# Patient Record
Sex: MALE | Race: WHITE | NOT HISPANIC OR LATINO | Employment: OTHER | URBAN - METROPOLITAN AREA
[De-identification: names, ages, dates, MRNs, and addresses within clinical notes are randomized per-mention and may not be internally consistent; named-entity substitution may affect disease eponyms.]

---

## 2017-01-05 ENCOUNTER — TELEPHONE (OUTPATIENT)
Dept: ADMINISTRATIVE | Facility: OTHER | Age: 82
End: 2017-01-05

## 2017-01-05 NOTE — TELEPHONE ENCOUNTER
spoke with patient  Appointment on 1/25 has been rescheduled to 1/27  Patient confirmed appointment.

## 2017-01-27 ENCOUNTER — INITIAL CONSULT (OUTPATIENT)
Dept: HEMATOLOGY/ONCOLOGY | Facility: CLINIC | Age: 82
End: 2017-01-27
Payer: MEDICARE

## 2017-01-27 VITALS
RESPIRATION RATE: 18 BRPM | SYSTOLIC BLOOD PRESSURE: 102 MMHG | TEMPERATURE: 98 F | BODY MASS INDEX: 23.03 KG/M2 | WEIGHT: 143.31 LBS | HEART RATE: 78 BPM | DIASTOLIC BLOOD PRESSURE: 69 MMHG | OXYGEN SATURATION: 97 % | HEIGHT: 66 IN

## 2017-01-27 DIAGNOSIS — Z51.11 ENCOUNTER FOR CHEMOTHERAPY MANAGEMENT: ICD-10-CM

## 2017-01-27 DIAGNOSIS — G30.9 ALZHEIMER'S DEMENTIA WITH BEHAVIORAL DISTURBANCE, UNSPECIFIED TIMING OF DEMENTIA ONSET: ICD-10-CM

## 2017-01-27 DIAGNOSIS — F02.81 ALZHEIMER'S DEMENTIA WITH BEHAVIORAL DISTURBANCE, UNSPECIFIED TIMING OF DEMENTIA ONSET: ICD-10-CM

## 2017-01-27 DIAGNOSIS — R91.8 MASS OF LOWER LOBE OF LEFT LUNG: Primary | ICD-10-CM

## 2017-01-27 PROCEDURE — 1126F AMNT PAIN NOTED NONE PRSNT: CPT | Mod: S$GLB,,, | Performed by: INTERNAL MEDICINE

## 2017-01-27 PROCEDURE — 1159F MED LIST DOCD IN RCRD: CPT | Mod: S$GLB,,, | Performed by: INTERNAL MEDICINE

## 2017-01-27 PROCEDURE — 99215 OFFICE O/P EST HI 40 MIN: CPT | Mod: S$GLB,,, | Performed by: INTERNAL MEDICINE

## 2017-01-27 PROCEDURE — 1157F ADVNC CARE PLAN IN RCRD: CPT | Mod: S$GLB,,, | Performed by: INTERNAL MEDICINE

## 2017-01-27 PROCEDURE — 1160F RVW MEDS BY RX/DR IN RCRD: CPT | Mod: S$GLB,,, | Performed by: INTERNAL MEDICINE

## 2017-01-27 PROCEDURE — 99999 PR PBB SHADOW E&M-EST. PATIENT-LVL III: CPT | Mod: PBBFAC,,, | Performed by: INTERNAL MEDICINE

## 2017-01-27 PROCEDURE — 99499 UNLISTED E&M SERVICE: CPT | Mod: S$GLB,,, | Performed by: INTERNAL MEDICINE

## 2017-01-27 NOTE — LETTER
January 27, 2017      Ebenezer Harrell MD  [address]  [facility] [address]           [facility]ematology On[facility]  [address]  [facility] [address]  Phone: [phone]          Patient: Osiel Taylor   MR Number: [id number]   YOB: 1927   Date of Visit: 1/27/2017       Dear Dr. Ebenezer Harrell:    Thank you for referring Osiel Taylor to me for evaluation. Attached you will find relevant portions of my assessment and plan of care.    If you have questions, please do not hesitate to call me. I look forward to following Osiel Taylor along with you.    Sincerely,    Horacio Vega MD    Enclosure  CC:  No Recipients    If you would like to receive this communication electronically, please contact [email or web address] or [phone] to request more information on [employer] access.    For providers and/or their staff who would like to refer a patient to [facility], please contact us through our one-stop-shop provider referral line, Jose Boss, at [phone].    If you feel you have received this communication in error or would no longer like to receive these types of communications, please e-mail [email or web address]

## 2017-01-27 NOTE — PROGRESS NOTES
"Subjective:       Patient ID: Osiel Taylor is a 89 y.o. male.    Chief Complaint: Lung Cancer and Dementia    HPI   Osiel Taylor is an 89-year-old male to clinic for consultation regarding enlarging left lower lobe mass. Pt has a past medical history including dementia, HTN and post-herpetic neuralgia who initially presented to the ED on 12/10 for change in mental status w/ an episode of syncope. In the ED, a CXR showed an interval growth in size of a left lower lobe pulmonary nodule. A CT of the chest showed a large, soft tissue attenuating, 3.0 x 3.4 x 2.3 cm pulmonary mass. Patient has a history of smoking a PPD for approximately 50 years. Quit 20 year ago 2/2 to cough and SOB. According to wife, patient has not had any coughing, hemoptysis, loss of appetite, night sweats or fevers. She reports a good appetite.     He denies any mouth sores, nausea, vomiting, diarrhea, constipation, abdominal pain, weight loss or loss of appetite, chest pain, shortness of breath, leg swelling, fatigue, pain, headache, or dizziness.     He is accompanied by his wife.     Oncologic History (From Chart):  12/4/15- SQUAMOUS CELL CARCINOMA IN SITU to cutaneous horn lesion located on scalp    Diagnostic Results:  CT Chest w/o contrast 12/12/2016-"There is a large, soft tissue attenuating, 3.0 x 3.4 x 2.3 cm pulmonary mass within the superior segment of the left lower lobe. When compared to most recent chest CT of 11/17/2009, this has significantly intervally increased in size. There are multiple scattered regions of groundglass opacity throughout the right lung including an approximately 2.1 cm region in the right upper lobe with more extensive/confluent regions in the right middle and lower lobes. There is a more focal region of groundglass/sub-solid density in the right lower lobe measuring approximately 2.3 cm. Findings are nonspecific and could represent infection, and formation or neoplasm. No pleural fluid is present. No " "pneumothorax. There is diffuse central lobular emphysematous change of the pulmonary parenchyma."  CT Head w/o contrast 12/11/2016-"No acute abnormality. No definite hyperdense MCA is identified on either side. of note, noncontrast CT provides limited evaluation of vascular structures.  If detailed evaluation of vascular structures is needed, consider CTA or MRA.Generalized cervical volume loss with findings of chronic microvascular ischemic disease."  MRI Brain 12/13/16- Significantly limited and incomplete examination secondary to patient motion as well as premature termination of the examination secondary to patient inability to cooperate. No definite evidence to suggest acute infarct. Repeat examination is recommended when patient better able to tolerate the exam.Small focus of gradient susceptibility within the anterior right temporal horn, similar to prior MRI examination, likely reflecting sequela of remote trauma.  CT A/P 12/14/16- In this patient with known left lung mass, there is no definitive evidence of distant metastatic disease in the abdomen and pelvis. There is a 1.8 cm enhancing splenic lesion which becomes relatively isodense to the splenic parenchyma on delayed phase imaging. 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 Right lung patchy groundglass attenuation and soft tissue opacities, similar to prior exam, which are nonspecific and may represent pneumonia, pulmonary edema, with neoplastic process not excluded.    Review of Systems   Constitutional: Negative for activity change, appetite change, fatigue, fever and unexpected weight change.   HENT: Negative for mouth sores, nosebleeds and trouble swallowing.    Eyes: Negative for discharge and visual disturbance.   Respiratory: Negative for cough, shortness of breath and wheezing.    Cardiovascular: Negative for chest pain and leg swelling.   Gastrointestinal: " Negative for abdominal distention, abdominal pain, blood in stool, constipation, diarrhea, nausea and vomiting.   Genitourinary: Negative for decreased urine volume, difficulty urinating, frequency and hematuria.   Musculoskeletal: Negative for back pain.   Skin: Negative for color change and rash.   Neurological: Negative for weakness and headaches.   Hematological: Negative for adenopathy.   Psychiatric/Behavioral: Positive for behavioral problems (dementia). Negative for sleep disturbance. The patient is not nervous/anxious.    All other systems reviewed and are negative.      Allergies:  Review of patient's allergies indicates:   Allergen Reactions    Codeine Nausea Only       Medications:  Current Outpatient Prescriptions   Medication Sig Dispense Refill    hydrocodone-acetaminophen 10-325mg (NORCO)  mg Tab Take 1 tablet by mouth every 24 hours as needed for Pain.  0    pregabalin (LYRICA) 300 MG Cap Take 1 capsule (300 mg total) by mouth 2 (two) times daily. 60 capsule 6     No current facility-administered medications for this visit.        PMH:  Past Medical History   Diagnosis Date    Basal cell carcinoma     BPPV (benign paroxysmal positional vertigo)     Dementia     Heart disease, unspecified     Hypertension     PHN (postherpetic neuralgia)        PSH:  Past Surgical History   Procedure Laterality Date    Skin cancer destruction  1990s    Cataract extraction, bilateral  1980s       FamHx:  Family History   Problem Relation Age of Onset    Lung cancer Father     Diabetes Daughter     Heart disease Son        SocHx:  Social History     Social History    Marital status:      Spouse name: N/A    Number of children: N/A    Years of education: N/A     Occupational History    Not on file.     Social History Main Topics    Smoking status: Former Smoker     Packs/day: 1.00     Years: 50.00     Types: Cigarettes     Quit date: 1/27/1991    Smokeless tobacco: Never Used    Alcohol  use Yes      Comment: Rare    Drug use: No    Sexual activity: Not on file     Other Topics Concern    Not on file     Social History Narrative       Distress Score    Distress Score: 0        Practical Problems Physical Problems   : No Appearance: No   Housing: No Bathing / Dressing: No   Insurance / Financial: No Breathing: No    Transportation: No  Changes in Urination: No    Work / School: No  Constipation: No   Treatment Decisions: No  Diarrhea: No     Eating: No    Family Problems Fatigue: No    Dealing with Children: No Feeling Swollen: No    Dealing with Partner: No Fevers: No    Ability to Have Children: No  Getting Around: No    Family Health Issues: No  Indigestion: No     Memory / Concentration: No   Emotional Problems Mouth Sores: No    Depression: No  Nausea: No    Fears: No  Nose Dry / Congested: No    Nervousness: No  Pain: No    Sadness: No Sexual: No    Worry: No Skin Dry / Itchy: No    Lost of Interest in Usual Activities: No Sleep: No          Spiritual/Religions Concerns Tingling in Hands / Feet: No   Spritual / Episcopalian Concerns: No         Other Problems              Objective:      Physical Exam   Constitutional: He appears well-developed and well-nourished.   HENT:   Head: Normocephalic and atraumatic.   Mouth/Throat: Uvula is midline. No oropharyngeal exudate.   Eyes: Conjunctivae and EOM are normal. Pupils are equal, round, and reactive to light.   Neck: Trachea normal and normal range of motion. Neck supple. No tracheal deviation present.   Cardiovascular: Normal rate, regular rhythm, normal heart sounds and normal pulses.    No swelling noted to bilateral lower extremities.   Pulmonary/Chest: Effort normal and breath sounds normal. No respiratory distress. He has no wheezes. He has no rales.   Abdominal: Soft. Normal appearance and bowel sounds are normal. He exhibits no distension. There is no tenderness.   Musculoskeletal:   No spinal or paraspinal tenderness to  palpation.    Skin: Skin is warm, dry and intact. He is not diaphoretic.   Psychiatric: His speech is normal.   Nursing note and vitals reviewed.      Assessment:       1. Mass of lower lobe of left lung    2. Encounter for chemotherapy management    3. Alzheimer's dementia with behavioral disturbance, unspecified timing of dementia onset        Plan:       Osiel Taylor is an 89-year-old male to clinic for consultation regarding enlarging left lower lobe mass. The patient was previously seen by Dr. Vega in the hospital as a consult where the POA initially wanted to pursue a biopsy of the lung mass but subsequently refused. The patient's wife states that the patient does not wish to pursue a biopsy or treatment. Given advanced age, poor PS, and advanced, end-stage dementia, and the fact the family/ patient doesn't want to pursue further treatment, conservative measures with symptom control is both reasonable and appropriate. Discussed use of hospice and wife is in agreement. Will refer patient to hospice.    Distress Screening Results: Psychosocial Distress screening score of Distress Score: 0 noted and reviewed. No intervention indicated.       I have reviewed the notes, assessments, and/or procedures performed by the housestaff, as above.  I have personally interviewed and examined the patient at the beside, and rounded with the housestaff. I concur with her/his assessment and plan and the documentation of Osiel Taylor.  More than 60 mins were spent during this encounter, greater than 50% was spent in direct counseling and/or coordination of care.     Horacio Vega M.D., M.S.  Hematology/Oncology Attending  [facility]

## 2017-01-28 NOTE — PROGRESS NOTES
Patient, Osiel Taylor (MRN #[id number]), presented with a recent Platelet count less than 150 K/uL consistent with the definition of thrombocytopenia (ICD10 - D69.6).    Platelets   Date Value Ref Range Status   12/13/2016 134 (L) 150 - 350 K/uL Final     The patient's thrombocytopenia was monitored, evaluated, addressed and/or treated. This addendum to the medical record is made on 01/27/2017.

## 2017-02-01 ENCOUNTER — TELEPHONE (OUTPATIENT)
Dept: HEMATOLOGY/ONCOLOGY | Facility: CLINIC | Age: 82
End: 2017-02-01

## 2017-02-01 NOTE — TELEPHONE ENCOUNTER
----- Message from Jailene Allen sent at 2/1/2017 12:04 PM CST -----  Contact: pt's wife Pat  Pt's wife is calling to see what is going on with her hospice referral.  Please call to advise.    Contact number [phone]

## 2017-02-06 NOTE — TELEPHONE ENCOUNTER
"Returned call to pt's wife Kaleigh"Dimas Taylor to discuss hospice services. Referral was made to [facility].  "

## 2017-05-20 PROBLEM — R79.89 ELEVATED TROPONIN: Status: ACTIVE | Noted: 2017-05-20

## 2017-05-20 PROBLEM — G93.40 ACUTE ENCEPHALOPATHY: Status: ACTIVE | Noted: 2017-05-20

## 2017-05-20 PROBLEM — R10.9 ACUTE ABDOMINAL PAIN: Status: ACTIVE | Noted: 2017-05-20

## 2017-05-21 ENCOUNTER — HOSPITAL ENCOUNTER (EMERGENCY)
Facility: HOSPITAL | Age: 82
Discharge: HOSPICE | End: 2017-05-21
Attending: EMERGENCY MEDICINE
Payer: MEDICARE

## 2017-05-21 VITALS
OXYGEN SATURATION: 95 % | RESPIRATION RATE: 18 BRPM | HEART RATE: 82 BPM | TEMPERATURE: 98 F | SYSTOLIC BLOOD PRESSURE: 118 MMHG | DIASTOLIC BLOOD PRESSURE: 57 MMHG

## 2017-05-21 DIAGNOSIS — C79.9 METASTATIC CANCER: Primary | ICD-10-CM

## 2017-05-21 LAB
ALBUMIN SERPL BCP-MCNC: 3.2 G/DL
ALP SERPL-CCNC: 63 U/L
ALT SERPL W/O P-5'-P-CCNC: 9 U/L
ANION GAP SERPL CALC-SCNC: 10 MMOL/L
AST SERPL-CCNC: 20 U/L
BACTERIA #/AREA URNS AUTO: NORMAL /HPF
BASOPHILS # BLD AUTO: 0.03 K/UL
BASOPHILS NFR BLD: 0.3 %
BILIRUB SERPL-MCNC: 1.4 MG/DL
BILIRUB UR QL STRIP: NEGATIVE
BUN SERPL-MCNC: 19 MG/DL
CALCIUM SERPL-MCNC: 9.3 MG/DL
CHLORIDE SERPL-SCNC: 105 MMOL/L
CLARITY UR REFRACT.AUTO: ABNORMAL
CO2 SERPL-SCNC: 25 MMOL/L
COLOR UR AUTO: YELLOW
CREAT SERPL-MCNC: 1.2 MG/DL
DIFFERENTIAL METHOD: ABNORMAL
EOSINOPHIL # BLD AUTO: 0 K/UL
EOSINOPHIL NFR BLD: 0 %
ERYTHROCYTE [DISTWIDTH] IN BLOOD BY AUTOMATED COUNT: 14.3 %
EST. GFR  (AFRICAN AMERICAN): >60 ML/MIN/1.73 M^2
EST. GFR  (NON AFRICAN AMERICAN): 53.3 ML/MIN/1.73 M^2
GLUCOSE SERPL-MCNC: 116 MG/DL
GLUCOSE UR QL STRIP: NEGATIVE
HCT VFR BLD AUTO: 46.1 %
HGB BLD-MCNC: 15.8 G/DL
HGB UR QL STRIP: ABNORMAL
HYALINE CASTS UR QL AUTO: 1 /LPF
KETONES UR QL STRIP: ABNORMAL
LACTATE SERPL-SCNC: 1.7 MMOL/L
LEUKOCYTE ESTERASE UR QL STRIP: NEGATIVE
LYMPHOCYTES # BLD AUTO: 1 K/UL
LYMPHOCYTES NFR BLD: 9.7 %
MCH RBC QN AUTO: 30.7 PG
MCHC RBC AUTO-ENTMCNC: 34.3 %
MCV RBC AUTO: 90 FL
MICROSCOPIC COMMENT: NORMAL
MONOCYTES # BLD AUTO: 1.6 K/UL
MONOCYTES NFR BLD: 16 %
NEUTROPHILS # BLD AUTO: 7.4 K/UL
NEUTROPHILS NFR BLD: 73.9 %
NITRITE UR QL STRIP: NEGATIVE
PH UR STRIP: 5 [PH] (ref 5–8)
PLATELET # BLD AUTO: 127 K/UL
PMV BLD AUTO: 10.1 FL
POTASSIUM SERPL-SCNC: 4.1 MMOL/L
PROT SERPL-MCNC: 7.7 G/DL
PROT UR QL STRIP: ABNORMAL
RBC # BLD AUTO: 5.15 M/UL
RBC #/AREA URNS AUTO: 2 /HPF (ref 0–4)
SODIUM SERPL-SCNC: 140 MMOL/L
SP GR UR STRIP: 1.02 (ref 1–1.03)
SQUAMOUS #/AREA URNS AUTO: 0 /HPF
URN SPEC COLLECT METH UR: ABNORMAL
UROBILINOGEN UR STRIP-ACNC: ABNORMAL EU/DL
WBC # BLD AUTO: 9.94 K/UL
WBC #/AREA URNS AUTO: 0 /HPF (ref 0–5)

## 2017-05-21 PROCEDURE — 87088 URINE BACTERIA CULTURE: CPT

## 2017-05-21 PROCEDURE — 63600175 PHARM REV CODE 636 W HCPCS: Performed by: INTERNAL MEDICINE

## 2017-05-21 PROCEDURE — 87077 CULTURE AEROBIC IDENTIFY: CPT

## 2017-05-21 PROCEDURE — 87086 URINE CULTURE/COLONY COUNT: CPT

## 2017-05-21 PROCEDURE — 87040 BLOOD CULTURE FOR BACTERIA: CPT

## 2017-05-21 PROCEDURE — 99285 EMERGENCY DEPT VISIT HI MDM: CPT | Mod: 25

## 2017-05-21 PROCEDURE — P9612 CATHETERIZE FOR URINE SPEC: HCPCS

## 2017-05-21 PROCEDURE — 83605 ASSAY OF LACTIC ACID: CPT

## 2017-05-21 PROCEDURE — 96361 HYDRATE IV INFUSION ADD-ON: CPT

## 2017-05-21 PROCEDURE — 87186 SC STD MICRODIL/AGAR DIL: CPT

## 2017-05-21 PROCEDURE — 85025 COMPLETE CBC W/AUTO DIFF WBC: CPT

## 2017-05-21 PROCEDURE — 25000003 PHARM REV CODE 250: Performed by: INTERNAL MEDICINE

## 2017-05-21 PROCEDURE — 96374 THER/PROPH/DIAG INJ IV PUSH: CPT

## 2017-05-21 PROCEDURE — 99285 EMERGENCY DEPT VISIT HI MDM: CPT | Mod: GV,,, | Performed by: EMERGENCY MEDICINE

## 2017-05-21 PROCEDURE — 80053 COMPREHEN METABOLIC PANEL: CPT

## 2017-05-21 PROCEDURE — 81001 URINALYSIS AUTO W/SCOPE: CPT

## 2017-05-21 RX ORDER — LORAZEPAM 2 MG/ML
0.5 INJECTION INTRAMUSCULAR
Status: DISCONTINUED | OUTPATIENT
Start: 2017-05-21 | End: 2017-05-22 | Stop reason: HOSPADM

## 2017-05-21 RX ORDER — FENTANYL 25 UG/1
1 PATCH TRANSDERMAL
Status: DISCONTINUED | OUTPATIENT
Start: 2017-05-21 | End: 2017-05-22 | Stop reason: HOSPADM

## 2017-05-21 RX ORDER — DEXAMETHASONE SODIUM PHOSPHATE 4 MG/ML
4 INJECTION, SOLUTION INTRA-ARTICULAR; INTRALESIONAL; INTRAMUSCULAR; INTRAVENOUS; SOFT TISSUE ONCE
Status: COMPLETED | OUTPATIENT
Start: 2017-05-21 | End: 2017-05-21

## 2017-05-21 RX ORDER — MORPHINE SULFATE 2 MG/ML
4 INJECTION, SOLUTION INTRAMUSCULAR; INTRAVENOUS
Status: DISCONTINUED | OUTPATIENT
Start: 2017-05-21 | End: 2017-05-22 | Stop reason: HOSPADM

## 2017-05-21 RX ORDER — DEXTROSE MONOHYDRATE AND SODIUM CHLORIDE 5; .45 G/100ML; G/100ML
INJECTION, SOLUTION INTRAVENOUS CONTINUOUS
Status: DISCONTINUED | OUTPATIENT
Start: 2017-05-21 | End: 2017-05-22 | Stop reason: HOSPADM

## 2017-05-21 RX ORDER — MORPHINE SULFATE 2 MG/ML
2 INJECTION, SOLUTION INTRAMUSCULAR; INTRAVENOUS
Status: DISCONTINUED | OUTPATIENT
Start: 2017-05-21 | End: 2017-05-22 | Stop reason: HOSPADM

## 2017-05-21 RX ADMIN — DEXTROSE AND SODIUM CHLORIDE: 5; .45 INJECTION, SOLUTION INTRAVENOUS at 08:05

## 2017-05-21 RX ADMIN — FENTANYL 1 PATCH: 25 PATCH, EXTENDED RELEASE TRANSDERMAL at 09:05

## 2017-05-21 RX ADMIN — DEXAMETHASONE SODIUM PHOSPHATE 4 MG: 4 INJECTION, SOLUTION INTRAMUSCULAR; INTRAVENOUS at 08:05

## 2017-05-21 NOTE — ED TRIAGE NOTES
Patient brought in by due to altered mental status. Patient was seen yesterday at [facility]. Patient's family states patient has been in unresponsive since last Friday.

## 2017-05-22 NOTE — ED PROVIDER NOTES
Encounter Date: 5/21/2017    SCRIBE #1 NOTE: I, Zuleima Luis, am scribing for, and in the presence of,  Dr. Ayers. I have scribed the following portions of the note - the Resident attestation.       History     Chief Complaint   Patient presents with    Altered Mental Status     Pt presented to ER with alerted mental status. Pt is a hospice pt. Pt lethargic.      Review of patient's allergies indicates:   Allergen Reactions    Codeine Nausea Only     HPI   89 yo M with PMHx Alzheimer dementia with advanced lung cancer with home hospice presents with acute decrease in responsiveness.  Per wife at bedside, he is normally walking and converses easily.  He became altered on Friday per her report, yelling out in pain and not responding to questions.  Associated with urinary incontinence and 3 d constipation.  Denies fever/chills, patient coughing.  Of note, family went to [facility] where patient had a complete work up for AMS which was significant for elevated troponin and interval increase in L lung mass size.  UA with negative nitrite, negative leukocyte esterase.  EtOH, ammonia, PT/INR, amylase, lipase wnl.  CBC with WBC count of 10.47 k/uL elevated from last WBC count of 3.81 12/13/16.  CMP relatively unremarkable with BUN, Cr, and glucose unconcerning for dehydration, poor po intake reported by wife.  Calcium wnl at 9.5 with albumen of 4.5.  CXR significant only for interval enlargement of LLL lung mass, CT abd/pelvis with new small L pleural effusion in addition to enlarged mass otherwise no changes from prior.  CT head with chronic changes, no acute IC process noted.    Past Medical History:   Diagnosis Date    Basal cell carcinoma     BPPV (benign paroxysmal positional vertigo)     Dementia     Heart disease, unspecified     Hypertension     PHN (postherpetic neuralgia)      Past Surgical History:   Procedure Laterality Date    CATARACT EXTRACTION, BILATERAL  1980s    SKIN CANCER  DESTRUCTION  1990s     Family History   Problem Relation Age of Onset    Lung cancer Father     Diabetes Daughter     Heart disease Son      Social History   Substance Use Topics    Smoking status: Former Smoker     Packs/day: 1.00     Years: 50.00     Types: Cigarettes     Quit date: 1/27/1991    Smokeless tobacco: Never Used    Alcohol use Yes      Comment: Rare     Review of Systems   Unable to perform ROS: Dementia   Patient also non-verbal and altered above baseline at this time.    Physical Exam     Initial Vitals [05/21/17 1542]   BP Pulse Resp Temp SpO2   102/71 78 17 97.9 °F (36.6 °C) 95 %     Physical Exam  General:  Pt covered in seborrheic keratosis (head, face, chest), foul-smelling with incontinence  HEENT:  NCAT, PERRL, EOMI.  Oropharyngeal membranes non-erythematous/without exudate  Neck:  Supple, no lad, no JVD  Resp:  Symmetric expansion, diminished lung sounds at LLL otherwise CTAB  CVS:  RRR, no m/r/g.  No LE edema.  Peripheral pulses.  GI:  Abd non-distended, mildly tender to palpation over R side, +BS  MSk:  Patient yells to palpation of feet, but also yells out intermittently as if in pain.  No joint swelling or tenderness specifically.  Skin:  Extensive seborrheic keratosis  Neuro:  Patient not responding to questions or following commands.  Moves all extremities to noxious stimuli.  Psych:  Non-verbal, yelling out intermittently and sounds as if he is in pain    ED Course   Procedures  Labs Reviewed   CBC W/ AUTO DIFFERENTIAL - Abnormal; Notable for the following:        Result Value    Platelets 127 (*)     Mono # 1.6 (*)     Gran% 73.9 (*)     Lymph% 9.7 (*)     Mono% 16.0 (*)     All other components within normal limits   COMPREHENSIVE METABOLIC PANEL - Abnormal; Notable for the following:     Glucose 116 (*)     Albumin 3.2 (*)     Total Bilirubin 1.4 (*)     ALT 9 (*)     eGFR if non  53.3 (*)     All other components within normal limits   URINALYSIS - Abnormal;  Notable for the following:     Appearance, UA Hazy (*)     Protein, UA 1+ (*)     Ketones, UA Trace (*)     Occult Blood UA 1+ (*)     Urobilinogen, UA 2.0-3.0 (*)     All other components within normal limits   CULTURE, URINE   CULTURE, BLOOD   CULTURE, BLOOD   LACTIC ACID, PLASMA   LACTIC ACID, PLASMA   URINALYSIS MICROSCOPIC             Medical Decision Making:   History:   Old Medical Records: I decided to obtain old medical records.  Initial Assessment:   89 yo M with basal cell carcinoma diagnosis found to have lung mass with family declining biopsy due to Alzheimer dementia and relatively poor prognosis and quality of life.  Differential Diagnosis:   Acute metabolic encephalopathy, acute infectious encephalopathy, progression of Alzheimer dementia, progression of cancer  Clinical Tests:   Lab Tests: Ordered and Reviewed  ED Management:  8:13 PM Patient's wife had long discussion with Dr. Cardenas and they have decided on inpatient hospice as patient had work up and overnight stay in [facility] with no obvious infection or metabolic abnormality that could easily explain AMS.  Troponin elevated, but further work up deferred due to patient's status and decision to forego aggressive treatment by patient and his family on previous discussions.            Scribe Attestation:   Scribe #1: I performed the above scribed service and the documentation accurately describes the services I performed. I attest to the accuracy of the note.    Attending Attestation:   Physician Attestation Statement for Resident:  As the supervising MD   Physician Attestation Statement: I have personally seen and examined this patient.   I agree with the above history. -: 89 y.o. male with hx of alzheimer's and advanced lung cancer presents from home hospice for evaluation of AMS.   As the supervising MD I agree with the above PE.    As the supervising MD I agree with the above treatment, course, plan, and disposition.           Physician Attestation for Scribe:  Physician Attestation Statement for Scribe #1: I, Dr. Ayers, reviewed documentation, as scribed by Zuleima Luis in my presence, and it is both accurate and complete.                 ED Course     Clinical Impression:   The encounter diagnosis was Metastatic cancer.    Disposition:   Disposition: Transferred  Condition: Fair  hospice       Jeannie Brizuela MD  Resident  05/22/17 0856       Antonio Ayers MD  05/31/17 1526

## 2017-05-23 LAB — BACTERIA UR CULT: NORMAL

## 2017-05-26 LAB
BACTERIA BLD CULT: NORMAL
BACTERIA BLD CULT: NORMAL